# Patient Record
Sex: FEMALE | Race: WHITE | Employment: OTHER | URBAN - METROPOLITAN AREA
[De-identification: names, ages, dates, MRNs, and addresses within clinical notes are randomized per-mention and may not be internally consistent; named-entity substitution may affect disease eponyms.]

---

## 2018-08-22 ENCOUNTER — HOSPITAL ENCOUNTER (OUTPATIENT)
Age: 28
Discharge: HOME | End: 2018-08-24
Payer: COMMERCIAL

## 2018-08-22 PROCEDURE — 87624 HPV HI-RISK TYP POOLED RSLT: CPT

## 2018-08-22 PROCEDURE — G0123 SCREEN CERV/VAG THIN LAYER: HCPCS

## 2018-09-06 LAB
CORRESPONDING PAP CASE #: NORMAL
HPV, HIGH RISK: NEGATIVE

## 2019-04-23 ENCOUNTER — HOSPITAL ENCOUNTER (EMERGENCY)
Age: 29
Discharge: HOME | End: 2019-04-23
Attending: EMERGENCY MEDICINE
Payer: COMMERCIAL

## 2019-04-23 VITALS
RESPIRATION RATE: 16 BRPM | WEIGHT: 135 LBS | SYSTOLIC BLOOD PRESSURE: 136 MMHG | OXYGEN SATURATION: 99 % | TEMPERATURE: 98 F | HEART RATE: 64 BPM | BODY MASS INDEX: 22.49 KG/M2 | DIASTOLIC BLOOD PRESSURE: 82 MMHG | HEIGHT: 65 IN

## 2019-04-23 DIAGNOSIS — R53.83 FATIGUE, UNSPECIFIED TYPE: Primary | ICD-10-CM

## 2019-04-23 LAB
ANION GAP SERPL CALCULATED.3IONS-SCNC: 10 MMOL/L (ref 7–16)
BACTERIA: NORMAL /HPF
BASOPHILS ABSOLUTE: 0.07 E9/L (ref 0–0.2)
BASOPHILS RELATIVE PERCENT: 0.6 % (ref 0–2)
BILIRUBIN URINE: NEGATIVE
BLOOD, URINE: NORMAL
BUN BLDV-MCNC: 23 MG/DL (ref 6–20)
CALCIUM SERPL-MCNC: 9.2 MG/DL (ref 8.6–10.2)
CHLORIDE BLD-SCNC: 101 MMOL/L (ref 98–107)
CLARITY: CLEAR
CO2: 26 MMOL/L (ref 22–29)
COLOR: YELLOW
CREAT SERPL-MCNC: 0.9 MG/DL (ref 0.5–1)
EOSINOPHILS ABSOLUTE: 0.17 E9/L (ref 0.05–0.5)
EOSINOPHILS RELATIVE PERCENT: 1.6 % (ref 0–6)
GFR AFRICAN AMERICAN: >60
GFR NON-AFRICAN AMERICAN: >60 ML/MIN/1.73
GLUCOSE BLD-MCNC: 93 MG/DL (ref 74–99)
GLUCOSE URINE: NEGATIVE MG/DL
HCG, URINE, POC: NEGATIVE
HCT VFR BLD CALC: 43.4 % (ref 34–48)
HEMOGLOBIN: 14.6 G/DL (ref 11.5–15.5)
IMMATURE GRANULOCYTES #: 0.04 E9/L
IMMATURE GRANULOCYTES %: 0.4 % (ref 0–5)
KETONES, URINE: NEGATIVE MG/DL
LEUKOCYTE ESTERASE, URINE: NEGATIVE
LYMPHOCYTES ABSOLUTE: 2.44 E9/L (ref 1.5–4)
LYMPHOCYTES RELATIVE PERCENT: 22.5 % (ref 20–42)
Lab: NORMAL
MCH RBC QN AUTO: 29.7 PG (ref 26–35)
MCHC RBC AUTO-ENTMCNC: 33.6 % (ref 32–34.5)
MCV RBC AUTO: 88.2 FL (ref 80–99.9)
MONO TEST: NEGATIVE
MONOCYTES ABSOLUTE: 0.62 E9/L (ref 0.1–0.95)
MONOCYTES RELATIVE PERCENT: 5.7 % (ref 2–12)
NEGATIVE QC PASS/FAIL: NORMAL
NEUTROPHILS ABSOLUTE: 7.52 E9/L (ref 1.8–7.3)
NEUTROPHILS RELATIVE PERCENT: 69.2 % (ref 43–80)
NITRITE, URINE: NEGATIVE
PDW BLD-RTO: 11.9 FL (ref 11.5–15)
PH UA: 6 (ref 5–9)
PLATELET # BLD: 267 E9/L (ref 130–450)
PMV BLD AUTO: 9.6 FL (ref 7–12)
POSITIVE QC PASS/FAIL: NORMAL
POTASSIUM REFLEX MAGNESIUM: 3.9 MMOL/L (ref 3.5–5)
PROTEIN UA: NEGATIVE MG/DL
RBC # BLD: 4.92 E12/L (ref 3.5–5.5)
RBC UA: NORMAL /HPF (ref 0–2)
SODIUM BLD-SCNC: 137 MMOL/L (ref 132–146)
SPECIFIC GRAVITY UA: 1.01 (ref 1–1.03)
T4 TOTAL: 6.6 MCG/DL (ref 4.5–11.7)
TSH SERPL DL<=0.05 MIU/L-ACNC: 2.67 UIU/ML (ref 0.27–4.2)
UROBILINOGEN, URINE: 0.2 E.U./DL
WBC # BLD: 10.9 E9/L (ref 4.5–11.5)
WBC UA: NORMAL /HPF (ref 0–5)

## 2019-04-23 PROCEDURE — 99283 EMERGENCY DEPT VISIT LOW MDM: CPT

## 2019-04-23 PROCEDURE — 84436 ASSAY OF TOTAL THYROXINE: CPT

## 2019-04-23 PROCEDURE — 81001 URINALYSIS AUTO W/SCOPE: CPT

## 2019-04-23 PROCEDURE — 2580000003 HC RX 258: Performed by: STUDENT IN AN ORGANIZED HEALTH CARE EDUCATION/TRAINING PROGRAM

## 2019-04-23 PROCEDURE — 86308 HETEROPHILE ANTIBODY SCREEN: CPT

## 2019-04-23 PROCEDURE — 84443 ASSAY THYROID STIM HORMONE: CPT

## 2019-04-23 PROCEDURE — 80048 BASIC METABOLIC PNL TOTAL CA: CPT

## 2019-04-23 PROCEDURE — 93005 ELECTROCARDIOGRAM TRACING: CPT | Performed by: NURSE PRACTITIONER

## 2019-04-23 PROCEDURE — 85025 COMPLETE CBC W/AUTO DIFF WBC: CPT

## 2019-04-23 RX ORDER — 0.9 % SODIUM CHLORIDE 0.9 %
1000 INTRAVENOUS SOLUTION INTRAVENOUS ONCE
Status: COMPLETED | OUTPATIENT
Start: 2019-04-23 | End: 2019-04-23

## 2019-04-23 RX ADMIN — SODIUM CHLORIDE 1000 ML: 9 INJECTION, SOLUTION INTRAVENOUS at 21:15

## 2019-04-23 ASSESSMENT — ENCOUNTER SYMPTOMS
SORE THROAT: 0
NAUSEA: 0
EYE PAIN: 0
BACK PAIN: 0
EYE DISCHARGE: 0
DIARRHEA: 0
ABDOMINAL DISTENTION: 0
COUGH: 0
WHEEZING: 0
EYE REDNESS: 0
SHORTNESS OF BREATH: 0
ABDOMINAL PAIN: 0
VOMITING: 0
SINUS PRESSURE: 0

## 2019-04-23 NOTE — ED NOTES
FIRST PROVIDER CONTACT ASSESSMENT NOTE        Department of Emergency Medicine   4/23/19  7:55 PM    Chief Complaint: Fatigue (patient states she has been extremely fatigued for past 2 weeks; has had 2 episodes of lightheadedness; one episode ocurred while at gym today, denies symptoms at this time)      History of Present Illness:    Andreea Livingston is a 33 y.o. female who presents to the ED by private car for progressively worsening extreme fatigue and feeling like going to pass out x1 month, but worsening over the last 2 weeks. She is very active , works out at gym. 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 Menses regular. On no BCM. Focused Screening Exam:  Constitutional:  Alert, appears stated age and is in no distress. *ALLERGIES*     Orange juice; Other; Tetracyclines & related; and Acetaminophen     Vitals:    04/23/19 1953   BP: 129/82   Pulse: 64   Resp: 18   Temp: 98 °F (36.7 °C)   SpO2: 99%   Weight: 135 lb (61.2 kg)   Height: 5' 4.75\" (1.645 m)         Initial Plan of Care:  Initiate Treatment-Testing, Proceed toTreatment Area When Bed Available for ED Attending/MLP to Continue Care    -----------------END OF FIRST PROVIDER CONTACT ASSESSMENT NOTE--------------  Electronically signed by MAYURI Koo CNP   DD: 4/23/19    .      MAYURI Koo CNP  04/23/19 1958

## 2019-04-24 LAB
EKG ATRIAL RATE: 69 BPM
EKG P AXIS: 41 DEGREES
EKG P-R INTERVAL: 144 MS
EKG Q-T INTERVAL: 416 MS
EKG QRS DURATION: 90 MS
EKG QTC CALCULATION (BAZETT): 445 MS
EKG R AXIS: 72 DEGREES
EKG T AXIS: 44 DEGREES
EKG VENTRICULAR RATE: 69 BPM

## 2019-04-24 NOTE — ED PROVIDER NOTES
during the hospital encounter of 04/23/19   CBC Auto Differential   Result Value Ref Range    WBC 10.9 4.5 - 11.5 E9/L    RBC 4.92 3.50 - 5.50 E12/L    Hemoglobin 14.6 11.5 - 15.5 g/dL    Hematocrit 43.4 34.0 - 48.0 %    MCV 88.2 80.0 - 99.9 fL    MCH 29.7 26.0 - 35.0 pg    MCHC 33.6 32.0 - 34.5 %    RDW 11.9 11.5 - 15.0 fL    Platelets 098 494 - 377 E9/L    MPV 9.6 7.0 - 12.0 fL    Neutrophils % 69.2 43.0 - 80.0 %    Immature Granulocytes % 0.4 0.0 - 5.0 %    Lymphocytes % 22.5 20.0 - 42.0 %    Monocytes % 5.7 2.0 - 12.0 %    Eosinophils % 1.6 0.0 - 6.0 %    Basophils % 0.6 0.0 - 2.0 %    Neutrophils # 7.52 (H) 1.80 - 7.30 E9/L    Immature Granulocytes # 0.04 E9/L    Lymphocytes # 2.44 1.50 - 4.00 E9/L    Monocytes # 0.62 0.10 - 0.95 E9/L    Eosinophils # 0.17 0.05 - 0.50 E9/L    Basophils # 0.07 0.00 - 0.20 N9/K   Basic Metabolic Panel w/ Reflex to MG   Result Value Ref Range    Sodium 137 132 - 146 mmol/L    Potassium reflex Magnesium 3.9 3.5 - 5.0 mmol/L    Chloride 101 98 - 107 mmol/L    CO2 26 22 - 29 mmol/L    Anion Gap 10 7 - 16 mmol/L    Glucose 93 74 - 99 mg/dL    BUN 23 (H) 6 - 20 mg/dL    CREATININE 0.9 0.5 - 1.0 mg/dL    GFR Non-African American >60 >=60 mL/min/1.73    GFR African American >60     Calcium 9.2 8.6 - 10.2 mg/dL   Urinalysis, reflex to microscopic   Result Value Ref Range    Color, UA Yellow Straw/Yellow    Clarity, UA Clear Clear    Glucose, Ur Negative Negative mg/dL    Bilirubin Urine Negative Negative    Ketones, Urine Negative Negative mg/dL    Specific Gravity, UA 1.010 1.005 - 1.030    Blood, Urine TRACE-INTACT Negative    pH, UA 6.0 5.0 - 9.0    Protein, UA Negative Negative mg/dL    Urobilinogen, Urine 0.2 <2.0 E.U./dL    Nitrite, Urine Negative Negative    Leukocyte Esterase, Urine Negative Negative   Mononucleosis screen   Result Value Ref Range    Mono Test Negative Negative   TSH without Reflex   Result Value Ref Range    TSH 2.670 0.270 - 4.200 uIU/mL   T4   Result Value Ref [facility]  Resident  04/24/19 [id number]

## 2019-10-02 ENCOUNTER — HOSPITAL ENCOUNTER (OUTPATIENT)
Age: 29
Discharge: HOME | End: 2019-10-04
Payer: COMMERCIAL

## 2019-10-02 LAB
BILIRUBIN URINE: NEGATIVE
BLOOD, URINE: NEGATIVE
CLARITY: CLEAR
COLOR: YELLOW
GLUCOSE URINE: NEGATIVE MG/DL
HCT VFR BLD CALC: 42.5 % (ref 34–48)
HEMOGLOBIN: 13.7 G/DL (ref 11.5–15.5)
KETONES, URINE: NEGATIVE MG/DL
LEUKOCYTE ESTERASE, URINE: NEGATIVE
MCH RBC QN AUTO: 29.7 PG (ref 26–35)
MCHC RBC AUTO-ENTMCNC: 32.2 % (ref 32–34.5)
MCV RBC AUTO: 92.2 FL (ref 80–99.9)
NITRITE, URINE: NEGATIVE
PDW BLD-RTO: 12.5 FL (ref 11.5–15)
PH UA: 6 (ref 5–9)
PLATELET # BLD: 222 E9/L (ref 130–450)
PMV BLD AUTO: 10.3 FL (ref 7–12)
PROTEIN UA: NEGATIVE MG/DL
RBC # BLD: 4.61 E12/L (ref 3.5–5.5)
SPECIFIC GRAVITY UA: 1.02 (ref 1–1.03)
UROBILINOGEN, URINE: 0.2 E.U./DL
WBC # BLD: 9 E9/L (ref 4.5–11.5)

## 2019-10-02 PROCEDURE — 86850 RBC ANTIBODY SCREEN: CPT

## 2019-10-02 PROCEDURE — 86762 RUBELLA ANTIBODY: CPT

## 2019-10-02 PROCEDURE — G0123 SCREEN CERV/VAG THIN LAYER: HCPCS

## 2019-10-02 PROCEDURE — 87624 HPV HI-RISK TYP POOLED RSLT: CPT

## 2019-10-02 PROCEDURE — 86703 HIV-1/HIV-2 1 RESULT ANTBDY: CPT

## 2019-10-02 PROCEDURE — 87491 CHLMYD TRACH DNA AMP PROBE: CPT

## 2019-10-02 PROCEDURE — 85027 COMPLETE CBC AUTOMATED: CPT

## 2019-10-02 PROCEDURE — 87088 URINE BACTERIA CULTURE: CPT

## 2019-10-02 PROCEDURE — 86592 SYPHILIS TEST NON-TREP QUAL: CPT

## 2019-10-02 PROCEDURE — 80307 DRUG TEST PRSMV CHEM ANLYZR: CPT

## 2019-10-02 PROCEDURE — 87340 HEPATITIS B SURFACE AG IA: CPT

## 2019-10-02 PROCEDURE — 81003 URINALYSIS AUTO W/O SCOPE: CPT

## 2019-10-02 PROCEDURE — 87591 N.GONORRHOEAE DNA AMP PROB: CPT

## 2019-10-03 LAB
AMPHETAMINE SCREEN, URINE: NOT DETECTED
ANTIBODY SCREEN: NORMAL
BARBITURATE SCREEN URINE: NOT DETECTED
BENZODIAZEPINE SCREEN, URINE: NOT DETECTED
CANNABINOID SCREEN URINE: NOT DETECTED
COCAINE METABOLITE SCREEN URINE: NOT DETECTED
HEPATITIS B SURFACE ANTIGEN INTERPRETATION: NORMAL
HIV-1 AND HIV-2 ANTIBODIES: NORMAL
Lab: NORMAL
METHADONE SCREEN, URINE: NOT DETECTED
OPIATE SCREEN URINE: NOT DETECTED
PHENCYCLIDINE SCREEN URINE: NOT DETECTED
PROPOXYPHENE SCREEN: NOT DETECTED
RUBELLA ANTIBODY IGG: NORMAL

## 2019-10-04 LAB
RPR: NORMAL
URINE CULTURE, ROUTINE: NORMAL

## 2019-10-07 LAB — ABO/RH: NORMAL

## 2019-10-08 LAB
CHLAMYDIA BY THIN PREP: NEGATIVE
HPV SAMPLE: NORMAL
HPV TYPE 16: NOT DETECTED
HPV TYPE 18: NOT DETECTED
HPV, HIGH RISK OTHER: NOT DETECTED
INTERPRETATION: NORMAL
N. GONORRHOEAE DNA, THIN PREP: NEGATIVE
SOURCE: NORMAL
SOURCE: NORMAL

## 2019-11-27 ENCOUNTER — HOSPITAL ENCOUNTER (OUTPATIENT)
Age: 29
Discharge: HOME | End: 2019-11-29
Payer: COMMERCIAL

## 2019-11-27 PROCEDURE — 82105 ALPHA-FETOPROTEIN SERUM: CPT

## 2019-12-01 LAB
AFP INTERPRETATION: NORMAL
AFP MOM: 2.42
AFP SPECIMEN: NORMAL
DATING: NORMAL
ESTIMATED DUE DATE: NORMAL
FETUS COUNT: NORMAL
GESTATIONAL AGE CALC AT COLLECT: NORMAL
HISTORY/NEURAL TUBE DEFECTS: NO
INSULIN DEP. DIABETIC: NO
MATERNAL AGE AT EDD: 30.5 YR
MATERNAL WEIGHT: NORMAL
PT AFP: 58 NG/ML
RACE: NORMAL
SMOKING: NORMAL

## 2019-12-12 ENCOUNTER — ROUTINE PRENATAL (OUTPATIENT)
Dept: OBGYN CLINIC | Age: 29
End: 2019-12-12
Payer: COMMERCIAL

## 2019-12-12 VITALS
BODY MASS INDEX: 26.29 KG/M2 | WEIGHT: 154 LBS | SYSTOLIC BLOOD PRESSURE: 136 MMHG | DIASTOLIC BLOOD PRESSURE: 81 MMHG | HEIGHT: 64 IN | HEART RATE: 69 BPM

## 2019-12-12 DIAGNOSIS — Z03.75 SUSPECTED SHORTENING OF CERVIX NOT FOUND: ICD-10-CM

## 2019-12-12 DIAGNOSIS — Z34.90 PREGNANCY, UNSPECIFIED GESTATIONAL AGE: ICD-10-CM

## 2019-12-12 DIAGNOSIS — Z36.3 ENCOUNTER FOR ANTENATAL SCREENING FOR MALFORMATION USING ULTRASOUND: ICD-10-CM

## 2019-12-12 LAB
GLUCOSE URINE, POC: NORMAL
PROTEIN UA: NEGATIVE

## 2019-12-12 PROCEDURE — 76811 OB US DETAILED SNGL FETUS: CPT | Performed by: OBSTETRICS & GYNECOLOGY

## 2019-12-12 PROCEDURE — 99201 HC NEW PT, E/M LEVEL 1: CPT | Performed by: OBSTETRICS & GYNECOLOGY

## 2019-12-12 PROCEDURE — 76817 TRANSVAGINAL US OBSTETRIC: CPT | Performed by: OBSTETRICS & GYNECOLOGY

## 2019-12-12 PROCEDURE — 81002 URINALYSIS NONAUTO W/O SCOPE: CPT | Performed by: OBSTETRICS & GYNECOLOGY

## 2019-12-12 RX ORDER — METHYLPREDNISOLONE 4 MG/1
TABLET ORAL
Status: ON HOLD | COMMUNITY
Start: 2019-12-07 | End: 2020-04-18 | Stop reason: ALTCHOICE

## 2019-12-12 RX ORDER — FOLIC ACID 1 MG/1
TABLET ORAL
COMMUNITY

## 2020-01-24 ENCOUNTER — HOSPITAL ENCOUNTER (OUTPATIENT)
Age: 30
Discharge: HOME | End: 2020-01-26
Payer: COMMERCIAL

## 2020-01-24 LAB
GLUCOSE TOLERANCE SCREEN 50G: 103 MG/DL (ref 70–140)
HCT VFR BLD CALC: 42.3 % (ref 34–48)
HEMOGLOBIN: 13.6 G/DL (ref 11.5–15.5)
MCH RBC QN AUTO: 29.8 PG (ref 26–35)
MCHC RBC AUTO-ENTMCNC: 32.2 % (ref 32–34.5)
MCV RBC AUTO: 92.6 FL (ref 80–99.9)
PDW BLD-RTO: 12.7 FL (ref 11.5–15)
PLATELET # BLD: 182 E9/L (ref 130–450)
PMV BLD AUTO: 10.4 FL (ref 7–12)
RBC # BLD: 4.57 E12/L (ref 3.5–5.5)
WBC # BLD: 10.5 E9/L (ref 4.5–11.5)

## 2020-01-24 PROCEDURE — 85027 COMPLETE CBC AUTOMATED: CPT

## 2020-01-24 PROCEDURE — 82950 GLUCOSE TEST: CPT

## 2020-04-08 ENCOUNTER — HOSPITAL ENCOUNTER (OUTPATIENT)
Age: 30
Discharge: HOME | End: 2020-04-10
Payer: COMMERCIAL

## 2020-04-08 PROCEDURE — 87081 CULTURE SCREEN ONLY: CPT

## 2020-04-11 LAB — GROUP B STREP CULTURE: NORMAL

## 2020-04-18 ENCOUNTER — HOSPITAL ENCOUNTER (INPATIENT)
Age: 30
LOS: 1 days | Discharge: HOME | End: 2020-04-20
Attending: OBSTETRICS & GYNECOLOGY | Admitting: OBSTETRICS & GYNECOLOGY
Payer: COMMERCIAL

## 2020-04-19 ENCOUNTER — ANESTHESIA (OUTPATIENT)
Dept: LABOR AND DELIVERY | Age: 30
End: 2020-04-19
Payer: COMMERCIAL

## 2020-04-19 ENCOUNTER — ANESTHESIA EVENT (OUTPATIENT)
Dept: LABOR AND DELIVERY | Age: 30
End: 2020-04-19
Payer: COMMERCIAL

## 2020-04-19 PROBLEM — O42.90 PROM (PREMATURE RUPTURE OF MEMBRANES): Status: ACTIVE | Noted: 2020-04-19

## 2020-04-19 LAB
ABO/RH: NORMAL
AMPHETAMINE SCREEN, URINE: NOT DETECTED
ANTIBODY SCREEN: NORMAL
BARBITURATE SCREEN URINE: NOT DETECTED
BASOPHILS ABSOLUTE: 0.08 E9/L (ref 0–0.2)
BASOPHILS RELATIVE PERCENT: 0.5 % (ref 0–2)
BENZODIAZEPINE SCREEN, URINE: NOT DETECTED
CANNABINOID SCREEN URINE: NOT DETECTED
COCAINE METABOLITE SCREEN URINE: NOT DETECTED
EOSINOPHILS ABSOLUTE: 0.15 E9/L (ref 0.05–0.5)
EOSINOPHILS RELATIVE PERCENT: 1 % (ref 0–6)
FENTANYL SCREEN, URINE: NOT DETECTED
HCT VFR BLD CALC: 41.4 % (ref 34–48)
HEMOGLOBIN: 14.2 G/DL (ref 11.5–15.5)
IMMATURE GRANULOCYTES #: 0.16 E9/L
IMMATURE GRANULOCYTES %: 1.1 % (ref 0–5)
LYMPHOCYTES ABSOLUTE: 2.29 E9/L (ref 1.5–4)
LYMPHOCYTES RELATIVE PERCENT: 15.2 % (ref 20–42)
Lab: NORMAL
MCH RBC QN AUTO: 30.4 PG (ref 26–35)
MCHC RBC AUTO-ENTMCNC: 34.3 % (ref 32–34.5)
MCV RBC AUTO: 88.7 FL (ref 80–99.9)
METHADONE SCREEN, URINE: NOT DETECTED
MONOCYTES ABSOLUTE: 1.23 E9/L (ref 0.1–0.95)
MONOCYTES RELATIVE PERCENT: 8.2 % (ref 2–12)
NEUTROPHILS ABSOLUTE: 11.14 E9/L (ref 1.8–7.3)
NEUTROPHILS RELATIVE PERCENT: 74 % (ref 43–80)
OPIATE SCREEN URINE: NOT DETECTED
OXYCODONE URINE: NOT DETECTED
PDW BLD-RTO: 13.4 FL (ref 11.5–15)
PHENCYCLIDINE SCREEN URINE: NOT DETECTED
PLATELET # BLD: 265 E9/L (ref 130–450)
PMV BLD AUTO: 10.5 FL (ref 7–12)
RBC # BLD: 4.67 E12/L (ref 3.5–5.5)
WBC # BLD: 15.1 E9/L (ref 4.5–11.5)

## 2020-04-19 PROCEDURE — 2500000003 HC RX 250 WO HCPCS

## 2020-04-19 PROCEDURE — 6370000000 HC RX 637 (ALT 250 FOR IP): Performed by: OBSTETRICS & GYNECOLOGY

## 2020-04-19 PROCEDURE — 2500000003 HC RX 250 WO HCPCS: Performed by: ANESTHESIOLOGY

## 2020-04-19 PROCEDURE — 86901 BLOOD TYPING SEROLOGIC RH(D): CPT

## 2020-04-19 PROCEDURE — 7200000001 HC VAGINAL DELIVERY

## 2020-04-19 PROCEDURE — 99233 SBSQ HOSP IP/OBS HIGH 50: CPT | Performed by: MIDWIFE

## 2020-04-19 PROCEDURE — 6360000002 HC RX W HCPCS: Performed by: OBSTETRICS & GYNECOLOGY

## 2020-04-19 PROCEDURE — 86850 RBC ANTIBODY SCREEN: CPT

## 2020-04-19 PROCEDURE — 1220000000 HC SEMI PRIVATE OB R&B

## 2020-04-19 PROCEDURE — 6360000002 HC RX W HCPCS

## 2020-04-19 PROCEDURE — 80307 DRUG TEST PRSMV CHEM ANLYZR: CPT

## 2020-04-19 PROCEDURE — 6360000002 HC RX W HCPCS: Performed by: MIDWIFE

## 2020-04-19 PROCEDURE — 2580000003 HC RX 258: Performed by: MIDWIFE

## 2020-04-19 PROCEDURE — 3700000025 EPIDURAL BLOCK: Performed by: ANESTHESIOLOGY

## 2020-04-19 PROCEDURE — 85025 COMPLETE CBC W/AUTO DIFF WBC: CPT

## 2020-04-19 PROCEDURE — 2580000003 HC RX 258: Performed by: OBSTETRICS & GYNECOLOGY

## 2020-04-19 PROCEDURE — 51701 INSERT BLADDER CATHETER: CPT

## 2020-04-19 PROCEDURE — 36415 COLL VENOUS BLD VENIPUNCTURE: CPT

## 2020-04-19 PROCEDURE — 86900 BLOOD TYPING SEROLOGIC ABO: CPT

## 2020-04-19 RX ORDER — SODIUM CHLORIDE, SODIUM LACTATE, POTASSIUM CHLORIDE, CALCIUM CHLORIDE 600; 310; 30; 20 MG/100ML; MG/100ML; MG/100ML; MG/100ML
INJECTION, SOLUTION INTRAVENOUS CONTINUOUS
Status: DISCONTINUED | OUTPATIENT
Start: 2020-04-19 | End: 2020-04-20 | Stop reason: HOSPADM

## 2020-04-19 RX ORDER — SODIUM CHLORIDE 0.9 % (FLUSH) 0.9 %
10 SYRINGE (ML) INJECTION PRN
Status: DISCONTINUED | OUTPATIENT
Start: 2020-04-19 | End: 2020-04-20 | Stop reason: HOSPADM

## 2020-04-19 RX ORDER — ACETAMINOPHEN 650 MG
TABLET, EXTENDED RELEASE ORAL
Status: DISCONTINUED
Start: 2020-04-19 | End: 2020-04-19

## 2020-04-19 RX ORDER — IBUPROFEN 800 MG/1
800 TABLET ORAL EVERY 8 HOURS PRN
Status: DISCONTINUED | OUTPATIENT
Start: 2020-04-19 | End: 2020-04-20 | Stop reason: HOSPADM

## 2020-04-19 RX ORDER — ONDANSETRON 2 MG/ML
4 INJECTION INTRAMUSCULAR; INTRAVENOUS EVERY 6 HOURS PRN
Status: DISCONTINUED | OUTPATIENT
Start: 2020-04-19 | End: 2020-04-19

## 2020-04-19 RX ORDER — NALBUPHINE HCL 10 MG/ML
5 AMPUL (ML) INJECTION EVERY 4 HOURS PRN
Status: DISCONTINUED | OUTPATIENT
Start: 2020-04-19 | End: 2020-04-19

## 2020-04-19 RX ORDER — SODIUM CHLORIDE, SODIUM LACTATE, POTASSIUM CHLORIDE, CALCIUM CHLORIDE 600; 310; 30; 20 MG/100ML; MG/100ML; MG/100ML; MG/100ML
INJECTION, SOLUTION INTRAVENOUS CONTINUOUS
Status: DISCONTINUED | OUTPATIENT
Start: 2020-04-19 | End: 2020-04-19

## 2020-04-19 RX ORDER — FERROUS SULFATE 325(65) MG
325 TABLET ORAL 2 TIMES DAILY WITH MEALS
Status: DISCONTINUED | OUTPATIENT
Start: 2020-04-19 | End: 2020-04-20 | Stop reason: HOSPADM

## 2020-04-19 RX ORDER — DOCUSATE SODIUM 100 MG/1
100 CAPSULE, LIQUID FILLED ORAL 2 TIMES DAILY
Status: DISCONTINUED | OUTPATIENT
Start: 2020-04-19 | End: 2020-04-20 | Stop reason: HOSPADM

## 2020-04-19 RX ORDER — LANOLIN 100 %
OINTMENT (GRAM) TOPICAL PRN
Status: DISCONTINUED | OUTPATIENT
Start: 2020-04-19 | End: 2020-04-20 | Stop reason: HOSPADM

## 2020-04-19 RX ORDER — LIDOCAINE HYDROCHLORIDE 10 MG/ML
INJECTION, SOLUTION INFILTRATION; PERINEURAL
Status: DISCONTINUED
Start: 2020-04-19 | End: 2020-04-19

## 2020-04-19 RX ORDER — NALOXONE HYDROCHLORIDE 0.4 MG/ML
0.4 INJECTION, SOLUTION INTRAMUSCULAR; INTRAVENOUS; SUBCUTANEOUS PRN
Status: DISCONTINUED | OUTPATIENT
Start: 2020-04-19 | End: 2020-04-19

## 2020-04-19 RX ORDER — SODIUM CHLORIDE 0.9 % (FLUSH) 0.9 %
10 SYRINGE (ML) INJECTION EVERY 12 HOURS SCHEDULED
Status: DISCONTINUED | OUTPATIENT
Start: 2020-04-19 | End: 2020-04-20 | Stop reason: HOSPADM

## 2020-04-19 RX ADMIN — LIDOCAINE HYDROCHLORIDE: 10 INJECTION, SOLUTION INFILTRATION; PERINEURAL at 09:15

## 2020-04-19 RX ADMIN — DOCUSATE SODIUM 100 MG: 100 CAPSULE, LIQUID FILLED ORAL at 12:31

## 2020-04-19 RX ADMIN — Medication: at 09:18

## 2020-04-19 RX ADMIN — Medication 1 MG: at 02:37

## 2020-04-19 RX ADMIN — SODIUM CHLORIDE, POTASSIUM CHLORIDE, SODIUM LACTATE AND CALCIUM CHLORIDE 125 ML/HR: 600; 310; 30; 20 INJECTION, SOLUTION INTRAVENOUS at 10:34

## 2020-04-19 RX ADMIN — SODIUM CHLORIDE, POTASSIUM CHLORIDE, SODIUM LACTATE AND CALCIUM CHLORIDE: 600; 310; 30; 20 INJECTION, SOLUTION INTRAVENOUS at 00:20

## 2020-04-19 RX ADMIN — SODIUM CHLORIDE, POTASSIUM CHLORIDE, SODIUM LACTATE AND CALCIUM CHLORIDE: 600; 310; 30; 20 INJECTION, SOLUTION INTRAVENOUS at 04:35

## 2020-04-19 RX ADMIN — Medication 15 ML/HR: at 05:07

## 2020-04-19 RX ADMIN — SODIUM CHLORIDE, PRESERVATIVE FREE 10 ML: 5 INJECTION INTRAVENOUS at 12:31

## 2020-04-19 RX ADMIN — Medication 1 MILLI-UNITS/MIN: at 00:35

## 2020-04-19 RX ADMIN — Medication 15 ML: at 05:05

## 2020-04-19 RX ADMIN — BUTORPHANOL TARTRATE 1 MG: 2 INJECTION, SOLUTION INTRAMUSCULAR; INTRAVENOUS at 02:37

## 2020-04-19 RX ADMIN — BENZOCAINE AND LEVOMENTHOL: 200; 5 SPRAY TOPICAL at 12:31

## 2020-04-19 RX ADMIN — IBUPROFEN 800 MG: 800 TABLET, FILM COATED ORAL at 20:03

## 2020-04-19 RX ADMIN — IBUPROFEN 800 MG: 800 TABLET, FILM COATED ORAL at 12:00

## 2020-04-19 RX ADMIN — Medication 999 MILLI-UNITS/MIN: at 09:26

## 2020-04-19 RX ADMIN — DOCUSATE SODIUM 100 MG: 100 CAPSULE, LIQUID FILLED ORAL at 20:04

## 2020-04-19 ASSESSMENT — PAIN DESCRIPTION - DESCRIPTORS
DESCRIPTORS: PRESSURE
DESCRIPTORS: TENDER

## 2020-04-19 ASSESSMENT — PAIN SCALES - GENERAL
PAINLEVEL_OUTOF10: 5
PAINLEVEL_OUTOF10: 6
PAINLEVEL_OUTOF10: 3
PAINLEVEL_OUTOF10: 3
PAINLEVEL_OUTOF10: 10
PAINLEVEL_OUTOF10: 10
PAINLEVEL_OUTOF10: 5

## 2020-04-19 ASSESSMENT — PAIN DESCRIPTION - ONSET
ONSET: ON-GOING
ONSET: GRADUAL

## 2020-04-19 ASSESSMENT — PAIN - FUNCTIONAL ASSESSMENT
PAIN_FUNCTIONAL_ASSESSMENT: ACTIVITIES ARE NOT PREVENTED
PAIN_FUNCTIONAL_ASSESSMENT: ACTIVITIES ARE NOT PREVENTED

## 2020-04-19 ASSESSMENT — PAIN DESCRIPTION - PAIN TYPE
TYPE: ACUTE PAIN
TYPE: ACUTE PAIN

## 2020-04-19 ASSESSMENT — PAIN DESCRIPTION - FREQUENCY
FREQUENCY: INTERMITTENT
FREQUENCY: INTERMITTENT

## 2020-04-19 ASSESSMENT — PAIN DESCRIPTION - PROGRESSION
CLINICAL_PROGRESSION: GRADUALLY WORSENING
CLINICAL_PROGRESSION: NOT CHANGED

## 2020-04-19 ASSESSMENT — PAIN DESCRIPTION - LOCATION
LOCATION: PERINEUM
LOCATION: PERINEUM;RECTUM

## 2020-04-19 ASSESSMENT — PAIN DESCRIPTION - ORIENTATION: ORIENTATION: LOWER

## 2020-04-19 NOTE — PROGRESS NOTES
Pt called out very uncomfortable, SVE 3-4/80/-2. Pt requesting epidural. Bolus started. Will notify anesthesia.

## 2020-04-19 NOTE — H&P
Department of Obstetrics and Gynecology  Nurse Practitioner Obstetrics History and Physical        CHIEF COMPLAINT:  Rupture of membranes    HISTORY OF PRESENT ILLNESS:  Lisset Husbands is a 26 y.o. female , Patient's last menstrual period was 2019.,  at 39w0d. Presents to L&D with c/o sudden gush of  Clear fluid around 9pm and has continued to leak clear fluid. Denies bleeding. No contractions prior to rupture but feeling menstrual type cramping now. States pregnancy uncomplicated, GBS negative      OB History        1    Para        Term                AB        Living           SAB        TAB        Ectopic        Molar        Multiple        Live Births                    Estimated Due Date: Estimated Date of Delivery: 20      Pregnancy complicated by:   Patient Active Problem List   Diagnosis Code    Shoulder pain, right M25.511    Encounter for  screening for malformation using ultrasound Z36.3    Suspected shortening of cervix not found Z03.75           PAST OB HISTORY  OB History        1    Para        Term                AB        Living           SAB        TAB        Ectopic        Molar        Multiple        Live Births                      Past Medical History:          Diagnosis Date    Brain anoxic injury ([facility] [address] 2011    patient fell off horse     H/O: blood clots     Headache(784.0)        Past Surgical History:          Procedure Laterality Date    BRAIN SURGERY  2010    craniotomy with  shunt    BREAST ENHANCEMENT SURGERY      GASTROSTOMY TUBE CHANGE      TRACHEOSTOMY TUBE PLACEMENT      WISDOM TOOTH EXTRACTION         Social History:    TOBACCO:   reports that she has never smoked. She has never used smokeless tobacco.  ETOH:   reports no history of alcohol use. DRUGS:   reports no history of drug use.   Family History:       Problem Relation Age of Onset    Arthritis Maternal Grandmother     High Blood Pressure

## 2020-04-19 NOTE — PROGRESS NOTES
Hearing screening results were discussed with parent. Questions answered. Brochure given to parent. Advised to monitor developmental milestones and contact physician for any concerns.    Walter Laguerre

## 2020-04-19 NOTE — ANESTHESIA PRE PROCEDURE
Department of Anesthesiology  Preprocedure Note       Name:  Annie Raymundo   Age:  26 y.o.  :  1990                                          MRN:  [id number]         Date:  2020      Surgeon: * No surgeons listed *    Procedure: Labor Analgesia    Medications prior to admission:   Prior to Admission medications    Medication Sig Start Date End Date Taking? Authorizing Provider   Prenatal Multivit-Min-Fe-FA (PRENATAL VITAMINS PO) PrePlus 27 mg iron-1 mg tablet   Yes Historical Provider, MD   folic acid (FOLVITE) 1 MG tablet folic acid 1 mg tablet   Yes Historical Provider, MD       Current medications:    Current Facility-Administered Medications   Medication Dose Route Frequency Provider Last Rate Last Dose    lactated ringers infusion   Intravenous Continuous MAYURI Galicia  mL/hr at 20 0435      oxytocin (PITOCIN) 30 units in 500 mL infusion  1 rahul-units/min Intravenous Continuous PRN MAYURI Galicia CNM        oxytocin (PITOCIN) 30 units in 500 mL infusion  1 rahul-units/min Intravenous Continuous MAYURI Galicia CNM 6 mL/hr at 20 0300 6 rahul-units/min at 20 0300    butorphanol (STADOL) injection 1 mg  1 mg Intravenous Q3H PRN Francy Morton MD   1 mg at 20 1048    Or    butorphanol (STADOL) injection 2 mg  2 mg Intravenous Q3H PRN Francy Morton MD           Allergies:     Allergies   Allergen Reactions    Other      \"any birth control\"    Tetracyclines & Related Other (See Comments)     Severe arthritis    Acetaminophen Rash       Problem List:    Patient Active Problem List   Diagnosis Code    Shoulder pain, right M25.511    Encounter for  screening for malformation using ultrasound Z36.3    Suspected shortening of cervix not found Z03.75    PROM (premature rupture of membranes) O42.90       Past Medical History:        Diagnosis Date    Brain anoxic injury ([facility] [address] 2011    patient fell off horse     H/O:

## 2020-04-19 NOTE — PROGRESS NOTES
Patient up to bathroom, voided and sheeba care provided. Patient tolerated well. Patient transferred to MB.

## 2020-04-20 VITALS
HEART RATE: 77 BPM | DIASTOLIC BLOOD PRESSURE: 66 MMHG | TEMPERATURE: 98.8 F | HEIGHT: 64 IN | BODY MASS INDEX: 29.37 KG/M2 | WEIGHT: 172 LBS | RESPIRATION RATE: 18 BRPM | SYSTOLIC BLOOD PRESSURE: 116 MMHG

## 2020-04-20 PROCEDURE — 6360000002 HC RX W HCPCS: Performed by: OBSTETRICS & GYNECOLOGY

## 2020-04-20 PROCEDURE — 90471 IMMUNIZATION ADMIN: CPT | Performed by: OBSTETRICS & GYNECOLOGY

## 2020-04-20 PROCEDURE — 90715 TDAP VACCINE 7 YRS/> IM: CPT | Performed by: OBSTETRICS & GYNECOLOGY

## 2020-04-20 PROCEDURE — 6370000000 HC RX 637 (ALT 250 FOR IP): Performed by: OBSTETRICS & GYNECOLOGY

## 2020-04-20 RX ORDER — IBUPROFEN 800 MG/1
800 TABLET ORAL EVERY 8 HOURS PRN
Qty: 18 TABLET | Refills: 0 | Status: SHIPPED | OUTPATIENT
Start: 2020-04-20 | End: 2020-10-13

## 2020-04-20 RX ADMIN — IBUPROFEN 800 MG: 800 TABLET, FILM COATED ORAL at 14:40

## 2020-04-20 RX ADMIN — DOCUSATE SODIUM 100 MG: 100 CAPSULE, LIQUID FILLED ORAL at 09:23

## 2020-04-20 RX ADMIN — TETANUS TOXOID, REDUCED DIPHTHERIA TOXOID AND ACELLULAR PERTUSSIS VACCINE, ADSORBED 0.5 ML: 5; 2.5; 8; 8; 2.5 SUSPENSION INTRAMUSCULAR at 09:26

## 2020-04-20 RX ADMIN — IBUPROFEN 800 MG: 800 TABLET, FILM COATED ORAL at 05:34

## 2020-04-20 RX ADMIN — BENZOCAINE AND LEVOMENTHOL: 200; 5 SPRAY TOPICAL at 09:23

## 2020-04-20 ASSESSMENT — PAIN DESCRIPTION - PROGRESSION
CLINICAL_PROGRESSION: GRADUALLY WORSENING
CLINICAL_PROGRESSION: GRADUALLY WORSENING
CLINICAL_PROGRESSION: NOT CHANGED

## 2020-04-20 ASSESSMENT — PAIN DESCRIPTION - DESCRIPTORS: DESCRIPTORS: SORE

## 2020-04-20 ASSESSMENT — PAIN DESCRIPTION - LOCATION: LOCATION: PERINEUM

## 2020-04-20 ASSESSMENT — PAIN - FUNCTIONAL ASSESSMENT
PAIN_FUNCTIONAL_ASSESSMENT: ACTIVITIES ARE NOT PREVENTED

## 2020-04-20 ASSESSMENT — PAIN DESCRIPTION - ORIENTATION: ORIENTATION: LOWER

## 2020-04-20 ASSESSMENT — PAIN SCALES - GENERAL
PAINLEVEL_OUTOF10: 3
PAINLEVEL_OUTOF10: 2

## 2020-04-20 ASSESSMENT — PAIN DESCRIPTION - PAIN TYPE: TYPE: ACUTE PAIN

## 2020-04-20 ASSESSMENT — PAIN DESCRIPTION - FREQUENCY: FREQUENCY: INTERMITTENT

## 2020-04-20 NOTE — FLOWSHEET NOTE
Assisted mother with breastfeeding positions and latching attempts. Infant, sleepy, disinterested. Occasionally will cry at breast, but will not latch. Mother to keep attempting to nurse every 2 hours or with infant hunger cues.

## 2020-08-17 ENCOUNTER — HOSPITAL ENCOUNTER (OUTPATIENT)
Age: 30
Discharge: HOME | End: 2020-08-19
Payer: COMMERCIAL

## 2020-08-17 PROCEDURE — 87591 N.GONORRHOEAE DNA AMP PROB: CPT

## 2020-08-17 PROCEDURE — 86703 HIV-1/HIV-2 1 RESULT ANTBDY: CPT

## 2020-08-17 PROCEDURE — 86850 RBC ANTIBODY SCREEN: CPT

## 2020-08-17 PROCEDURE — 87088 URINE BACTERIA CULTURE: CPT

## 2020-08-17 PROCEDURE — 87491 CHLMYD TRACH DNA AMP PROBE: CPT

## 2020-08-17 PROCEDURE — 81003 URINALYSIS AUTO W/O SCOPE: CPT

## 2020-08-17 PROCEDURE — 86900 BLOOD TYPING SEROLOGIC ABO: CPT

## 2020-08-17 PROCEDURE — 86592 SYPHILIS TEST NON-TREP QUAL: CPT

## 2020-08-17 PROCEDURE — 87340 HEPATITIS B SURFACE AG IA: CPT

## 2020-08-17 PROCEDURE — 80307 DRUG TEST PRSMV CHEM ANLYZR: CPT

## 2020-08-17 PROCEDURE — 85027 COMPLETE CBC AUTOMATED: CPT

## 2020-08-17 PROCEDURE — 86901 BLOOD TYPING SEROLOGIC RH(D): CPT

## 2020-08-17 PROCEDURE — 86762 RUBELLA ANTIBODY: CPT

## 2020-08-18 LAB
ABO/RH: NORMAL
AMPHETAMINE SCREEN, URINE: NOT DETECTED
ANTIBODY SCREEN: NORMAL
BARBITURATE SCREEN URINE: NOT DETECTED
BENZODIAZEPINE SCREEN, URINE: NOT DETECTED
BILIRUBIN URINE: NEGATIVE
BLOOD, URINE: NEGATIVE
CANNABINOID SCREEN URINE: NOT DETECTED
CLARITY: CLEAR
COCAINE METABOLITE SCREEN URINE: NOT DETECTED
COLOR: YELLOW
FENTANYL SCREEN, URINE: NOT DETECTED
GLUCOSE URINE: NEGATIVE MG/DL
HCT VFR BLD CALC: 44.7 % (ref 34–48)
HEMOGLOBIN: 14.5 G/DL (ref 11.5–15.5)
HEPATITIS B SURFACE ANTIGEN INTERPRETATION: NORMAL
HIV-1 AND HIV-2 ANTIBODIES: NORMAL
KETONES, URINE: NEGATIVE MG/DL
LEUKOCYTE ESTERASE, URINE: NEGATIVE
Lab: NORMAL
MCH RBC QN AUTO: 29.2 PG (ref 26–35)
MCHC RBC AUTO-ENTMCNC: 32.4 % (ref 32–34.5)
MCV RBC AUTO: 90.1 FL (ref 80–99.9)
METHADONE SCREEN, URINE: NOT DETECTED
NITRITE, URINE: NEGATIVE
OPIATE SCREEN URINE: NOT DETECTED
OXYCODONE URINE: NOT DETECTED
PDW BLD-RTO: 12.8 FL (ref 11.5–15)
PH UA: 6 (ref 5–9)
PHENCYCLIDINE SCREEN URINE: NOT DETECTED
PLATELET # BLD: 265 E9/L (ref 130–450)
PMV BLD AUTO: 10.5 FL (ref 7–12)
PROTEIN UA: NEGATIVE MG/DL
RBC # BLD: 4.96 E12/L (ref 3.5–5.5)
RPR: NORMAL
RUBELLA ANTIBODY IGG: NORMAL
SPECIFIC GRAVITY UA: 1.02 (ref 1–1.03)
UROBILINOGEN, URINE: 0.2 E.U./DL
WBC # BLD: 9.2 E9/L (ref 4.5–11.5)

## 2020-08-20 LAB
C TRACH DNA GENITAL QL NAA+PROBE: NEGATIVE
N. GONORRHOEAE DNA: NEGATIVE
SOURCE: NORMAL
URINE CULTURE, ROUTINE: NORMAL

## 2020-09-18 ENCOUNTER — HOSPITAL ENCOUNTER (OUTPATIENT)
Age: 30
Discharge: HOME | End: 2020-09-20
Payer: COMMERCIAL

## 2020-09-18 PROCEDURE — 82105 ALPHA-FETOPROTEIN SERUM: CPT

## 2020-09-22 LAB
AFP INTERPRETATION: NORMAL
AFP MOM: 0.6
AFP SPECIMEN: NORMAL
DATING: NORMAL
ESTIMATED DUE DATE: NORMAL
FETUS COUNT: NORMAL
GESTATIONAL AGE CALC AT COLLECT: NORMAL
HISTORY/NEURAL TUBE DEFECTS: NO
INSULIN DEP. DIABETIC: NO
MATERNAL AGE AT EDD: 31.3 YR
MATERNAL WEIGHT: NORMAL
PT AFP: 18 NG/ML
RACE: NORMAL
SMOKING: NO

## 2020-10-13 ENCOUNTER — INITIAL PRENATAL (OUTPATIENT)
Dept: OBGYN CLINIC | Age: 30
End: 2020-10-13
Payer: COMMERCIAL

## 2020-10-13 VITALS
BODY MASS INDEX: 26.82 KG/M2 | HEART RATE: 63 BPM | SYSTOLIC BLOOD PRESSURE: 111 MMHG | DIASTOLIC BLOOD PRESSURE: 61 MMHG | WEIGHT: 156.25 LBS | TEMPERATURE: 97.4 F

## 2020-10-13 PROBLEM — Z3A.19 19 WEEKS GESTATION OF PREGNANCY: Status: ACTIVE | Noted: 2020-10-13

## 2020-10-13 LAB
GLUCOSE URINE, POC: NEGATIVE
PROTEIN UA: NEGATIVE

## 2020-10-13 PROCEDURE — 76805 OB US >/= 14 WKS SNGL FETUS: CPT | Performed by: OBSTETRICS & GYNECOLOGY

## 2020-10-13 PROCEDURE — 99203 OFFICE O/P NEW LOW 30 MIN: CPT

## 2020-10-13 NOTE — PROGRESS NOTES
Flutters  Denies bleeding, leakage of fluid or contractions      Call your obstetrician for:    Bleeding, leakage of fluid, abdominal tenderness, headaches, burred vision or  epigastric pain  or increased in urinary frequency.    Call if any questions or concerns

## 2020-10-13 NOTE — PATIENT INSTRUCTIONS
Patient Education        Weeks 18 to 22 of Your Pregnancy: Care Instructions  Your Care Instructions     Your baby is continuing to develop quickly. At this stage, babies can now suck their thumbs,  firmly with their hands, and open and close their eyelids. Sometime between 18 and 22 weeks, you will start to feel your baby move. At first, these small fetal movements feel like fluttering or \"butterflies. \" Some women say that they feel like gas bubbles. As the baby grows, these movements will become stronger. You may also notice that your baby kicks and hiccups. During this time, you may find that your nausea and fatigue are gone. Overall, you may feel better and have more energy than you did in your first trimester. But you may also have new discomforts now, such as sleep problems or leg cramps. This care sheet can help you ease these discomforts. Follow-up care is a key part of your treatment and safety. Be sure to make and go to all appointments, and call your doctor if you are having problems. It's also a good idea to know your test results and keep a list of the medicines you take. How can you care for yourself at home? Ease sleep problems  · Avoid caffeine in drinks or chocolate late in the day. · Get some exercise every day. · Take a warm shower or bath before bed. · Have a light snack or glass of milk at bedtime. · Do relaxation exercises in bed to calm your mind and body. · Support your legs and back with extra pillows. Try a pillow between your legs if you sleep on your side. · Do not use sleeping pills or alcohol. They could harm your baby. Ease leg cramps  · Do not massage your calf during the cramp. · Sit on a firm bed or chair. Straighten your leg, and bend your foot (flex your ankle) slowly upward, toward your knee. Bend your toes up and down. · Stand on a cool, flat surface. Stretch your toes upward, and take small steps walking on your heels.   · Use a heating pad or hot water your doctor if you are having problems. It's also a good idea to know your test results and keep a list of the medicines you take. How can you care for yourself at home? Ease sleep problems  · Avoid caffeine in drinks or chocolate late in the day. · Get some exercise every day. · Take a warm shower or bath before bed. · Have a light snack or glass of milk at bedtime. · Do relaxation exercises in bed to calm your mind and body. · Support your legs and back with extra pillows. Try a pillow between your legs if you sleep on your side. · Do not use sleeping pills or alcohol. They could harm your baby. Ease leg cramps  · Do not massage your calf during the cramp. · Sit on a firm bed or chair. Straighten your leg, and bend your foot (flex your ankle) slowly upward, toward your knee. Bend your toes up and down. · Stand on a cool, flat surface. Stretch your toes upward, and take small steps walking on your heels. · Use a heating pad or hot water bottle to help with muscle ache. Prevent leg cramps  · Be sure to get enough calcium. If you are worried that you are not getting enough, talk to your doctor. · Exercise every day, and stretch your legs before bed. · Take a warm bath before bed, and try leg warmers at night. Where can you learn more? Go to [email or web address].[email or web address]. org and sign in to your [employer] account. Enter P363 in the [facility] box to learn more about \"Weeks 18 to 22 of Your Pregnancy: Care Instructions. \"     If you do not have an account, please click on the \"Sign Up Now\" link. Current as of: February 11, 2020               Content Version: 12.6  © 0568-1113 [employer]. Care instructions adapted under license by [facility] ([facility]). If you have questions about a medical condition or this instruction, always ask your healthcare professional. [address] any warranty or liability for your use of this information.

## 2021-03-01 ENCOUNTER — HOSPITAL ENCOUNTER (INPATIENT)
Age: 31
LOS: 2 days | Discharge: HOME | End: 2021-03-03
Attending: OBSTETRICS & GYNECOLOGY | Admitting: OBSTETRICS & GYNECOLOGY
Payer: COMMERCIAL

## 2021-03-01 ENCOUNTER — APPOINTMENT (OUTPATIENT)
Dept: LABOR AND DELIVERY | Age: 31
End: 2021-03-01
Payer: COMMERCIAL

## 2021-03-01 PROBLEM — Z3A.39 39 WEEKS GESTATION OF PREGNANCY: Status: ACTIVE | Noted: 2021-03-01

## 2021-03-01 LAB
ABO/RH: NORMAL
AMPHETAMINE SCREEN, URINE: NOT DETECTED
ANTIBODY SCREEN: NORMAL
BARBITURATE SCREEN URINE: NOT DETECTED
BENZODIAZEPINE SCREEN, URINE: NOT DETECTED
CANNABINOID SCREEN URINE: NOT DETECTED
COCAINE METABOLITE SCREEN URINE: NOT DETECTED
FENTANYL SCREEN, URINE: NOT DETECTED
HCT VFR BLD CALC: 39.7 % (ref 34–48)
HEMOGLOBIN: 13.4 G/DL (ref 11.5–15.5)
Lab: NORMAL
MCH RBC QN AUTO: 29.3 PG (ref 26–35)
MCHC RBC AUTO-ENTMCNC: 33.8 % (ref 32–34.5)
MCV RBC AUTO: 86.7 FL (ref 80–99.9)
METHADONE SCREEN, URINE: NOT DETECTED
OPIATE SCREEN URINE: NOT DETECTED
OXYCODONE URINE: NOT DETECTED
PDW BLD-RTO: 13.1 FL (ref 11.5–15)
PHENCYCLIDINE SCREEN URINE: NOT DETECTED
PLATELET # BLD: 276 E9/L (ref 130–450)
PMV BLD AUTO: 10.4 FL (ref 7–12)
RBC # BLD: 4.58 E12/L (ref 3.5–5.5)
SARS-COV-2, NAAT: NOT DETECTED
WBC # BLD: 15.5 E9/L (ref 4.5–11.5)

## 2021-03-01 PROCEDURE — 1220000001 HC SEMI PRIVATE L&D R&B

## 2021-03-01 PROCEDURE — 86900 BLOOD TYPING SEROLOGIC ABO: CPT

## 2021-03-01 PROCEDURE — 87635 SARS-COV-2 COVID-19 AMP PRB: CPT

## 2021-03-01 PROCEDURE — 86901 BLOOD TYPING SEROLOGIC RH(D): CPT

## 2021-03-01 PROCEDURE — 85027 COMPLETE CBC AUTOMATED: CPT

## 2021-03-01 PROCEDURE — 36415 COLL VENOUS BLD VENIPUNCTURE: CPT

## 2021-03-01 PROCEDURE — 2580000003 HC RX 258: Performed by: OBSTETRICS & GYNECOLOGY

## 2021-03-01 PROCEDURE — 80307 DRUG TEST PRSMV CHEM ANLYZR: CPT

## 2021-03-01 PROCEDURE — 86850 RBC ANTIBODY SCREEN: CPT

## 2021-03-01 RX ORDER — LIDOCAINE HYDROCHLORIDE 10 MG/ML
INJECTION, SOLUTION INFILTRATION; PERINEURAL
Status: DISCONTINUED
Start: 2021-03-01 | End: 2021-03-02

## 2021-03-01 RX ORDER — ACETAMINOPHEN 650 MG
TABLET, EXTENDED RELEASE ORAL
Status: DISCONTINUED
Start: 2021-03-01 | End: 2021-03-02

## 2021-03-01 RX ORDER — ONDANSETRON 2 MG/ML
4 INJECTION INTRAMUSCULAR; INTRAVENOUS EVERY 6 HOURS PRN
Status: DISCONTINUED | OUTPATIENT
Start: 2021-03-01 | End: 2021-03-02

## 2021-03-01 RX ORDER — SODIUM CHLORIDE, SODIUM LACTATE, POTASSIUM CHLORIDE, CALCIUM CHLORIDE 600; 310; 30; 20 MG/100ML; MG/100ML; MG/100ML; MG/100ML
INJECTION, SOLUTION INTRAVENOUS CONTINUOUS
Status: DISCONTINUED | OUTPATIENT
Start: 2021-03-01 | End: 2021-03-02

## 2021-03-01 RX ORDER — BIOTIN 5 MG
1 CAPSULE ORAL
COMMUNITY

## 2021-03-01 RX ADMIN — SODIUM CHLORIDE, POTASSIUM CHLORIDE, SODIUM LACTATE AND CALCIUM CHLORIDE: 600; 310; 30; 20 INJECTION, SOLUTION INTRAVENOUS at 22:30

## 2021-03-01 ASSESSMENT — PAIN SCALES - GENERAL: PAINLEVEL_OUTOF10: 0

## 2021-03-02 ENCOUNTER — ANESTHESIA (OUTPATIENT)
Dept: LABOR AND DELIVERY | Age: 31
End: 2021-03-02
Payer: COMMERCIAL

## 2021-03-02 ENCOUNTER — ANESTHESIA EVENT (OUTPATIENT)
Dept: LABOR AND DELIVERY | Age: 31
End: 2021-03-02
Payer: COMMERCIAL

## 2021-03-02 PROCEDURE — 7200000001 HC VAGINAL DELIVERY

## 2021-03-02 PROCEDURE — 6360000002 HC RX W HCPCS: Performed by: OBSTETRICS & GYNECOLOGY

## 2021-03-02 PROCEDURE — 51701 INSERT BLADDER CATHETER: CPT

## 2021-03-02 PROCEDURE — 2500000003 HC RX 250 WO HCPCS: Performed by: ANESTHESIOLOGY

## 2021-03-02 PROCEDURE — 0KQM0ZZ REPAIR PERINEUM MUSCLE, OPEN APPROACH: ICD-10-PCS | Performed by: OBSTETRICS & GYNECOLOGY

## 2021-03-02 PROCEDURE — 2580000003 HC RX 258: Performed by: OBSTETRICS & GYNECOLOGY

## 2021-03-02 PROCEDURE — 3E033VJ INTRODUCTION OF OTHER HORMONE INTO PERIPHERAL VEIN, PERCUTANEOUS APPROACH: ICD-10-PCS | Performed by: OBSTETRICS & GYNECOLOGY

## 2021-03-02 PROCEDURE — 1220000000 HC SEMI PRIVATE OB R&B

## 2021-03-02 PROCEDURE — 3700000025 EPIDURAL BLOCK: Performed by: ANESTHESIOLOGY

## 2021-03-02 PROCEDURE — 6370000000 HC RX 637 (ALT 250 FOR IP): Performed by: OBSTETRICS & GYNECOLOGY

## 2021-03-02 RX ORDER — SODIUM CHLORIDE 0.9 % (FLUSH) 0.9 %
10 SYRINGE (ML) INJECTION EVERY 12 HOURS SCHEDULED
Status: DISCONTINUED | OUTPATIENT
Start: 2021-03-02 | End: 2021-03-03 | Stop reason: HOSPADM

## 2021-03-02 RX ORDER — FERROUS SULFATE 325(65) MG
325 TABLET ORAL 2 TIMES DAILY WITH MEALS
Status: DISCONTINUED | OUTPATIENT
Start: 2021-03-02 | End: 2021-03-03 | Stop reason: HOSPADM

## 2021-03-02 RX ORDER — NALOXONE HYDROCHLORIDE 0.4 MG/ML
0.4 INJECTION, SOLUTION INTRAMUSCULAR; INTRAVENOUS; SUBCUTANEOUS PRN
Status: DISCONTINUED | OUTPATIENT
Start: 2021-03-02 | End: 2021-03-02

## 2021-03-02 RX ORDER — IBUPROFEN 800 MG/1
800 TABLET ORAL EVERY 8 HOURS PRN
Status: DISCONTINUED | OUTPATIENT
Start: 2021-03-02 | End: 2021-03-03 | Stop reason: HOSPADM

## 2021-03-02 RX ORDER — ONDANSETRON 2 MG/ML
4 INJECTION INTRAMUSCULAR; INTRAVENOUS EVERY 6 HOURS PRN
Status: DISCONTINUED | OUTPATIENT
Start: 2021-03-02 | End: 2021-03-02

## 2021-03-02 RX ORDER — LIDOCAINE HYDROCHLORIDE 10 MG/ML
20 INJECTION, SOLUTION EPIDURAL; INFILTRATION; INTRACAUDAL; PERINEURAL ONCE
Status: DISCONTINUED | OUTPATIENT
Start: 2021-03-02 | End: 2021-03-03 | Stop reason: HOSPADM

## 2021-03-02 RX ORDER — SODIUM CHLORIDE, SODIUM LACTATE, POTASSIUM CHLORIDE, CALCIUM CHLORIDE 600; 310; 30; 20 MG/100ML; MG/100ML; MG/100ML; MG/100ML
INJECTION, SOLUTION INTRAVENOUS CONTINUOUS
Status: DISCONTINUED | OUTPATIENT
Start: 2021-03-02 | End: 2021-03-03 | Stop reason: HOSPADM

## 2021-03-02 RX ORDER — ACETAMINOPHEN 650 MG
TABLET, EXTENDED RELEASE ORAL PRN
Status: DISCONTINUED | OUTPATIENT
Start: 2021-03-02 | End: 2021-03-03 | Stop reason: HOSPADM

## 2021-03-02 RX ORDER — MODIFIED LANOLIN
OINTMENT (GRAM) TOPICAL PRN
Status: DISCONTINUED | OUTPATIENT
Start: 2021-03-02 | End: 2021-03-03 | Stop reason: HOSPADM

## 2021-03-02 RX ORDER — NALBUPHINE HCL 10 MG/ML
5 AMPUL (ML) INJECTION EVERY 4 HOURS PRN
Status: DISCONTINUED | OUTPATIENT
Start: 2021-03-02 | End: 2021-03-02

## 2021-03-02 RX ORDER — DOCUSATE SODIUM 100 MG/1
100 CAPSULE, LIQUID FILLED ORAL 2 TIMES DAILY
Status: DISCONTINUED | OUTPATIENT
Start: 2021-03-02 | End: 2021-03-03 | Stop reason: HOSPADM

## 2021-03-02 RX ORDER — SODIUM CHLORIDE 0.9 % (FLUSH) 0.9 %
10 SYRINGE (ML) INJECTION PRN
Status: DISCONTINUED | OUTPATIENT
Start: 2021-03-02 | End: 2021-03-03 | Stop reason: HOSPADM

## 2021-03-02 RX ADMIN — IBUPROFEN 800 MG: 800 TABLET, FILM COATED ORAL at 11:28

## 2021-03-02 RX ADMIN — SODIUM CHLORIDE, PRESERVATIVE FREE 10 ML: 5 INJECTION INTRAVENOUS at 20:05

## 2021-03-02 RX ADMIN — Medication 10 ML/HR: at 05:01

## 2021-03-02 RX ADMIN — DOCUSATE SODIUM 100 MG: 100 CAPSULE, LIQUID FILLED ORAL at 11:28

## 2021-03-02 RX ADMIN — Medication 1 MILLI-UNITS/MIN: at 02:00

## 2021-03-02 RX ADMIN — BENZOCAINE AND LEVOMENTHOL: 200; 5 SPRAY TOPICAL at 11:28

## 2021-03-02 RX ADMIN — IBUPROFEN 800 MG: 800 TABLET, FILM COATED ORAL at 20:06

## 2021-03-02 RX ADMIN — Medication 166.7 ML: at 07:35

## 2021-03-02 RX ADMIN — DOCUSATE SODIUM 100 MG: 100 CAPSULE, LIQUID FILLED ORAL at 20:06

## 2021-03-02 ASSESSMENT — PAIN SCALES - GENERAL
PAINLEVEL_OUTOF10: 5
PAINLEVEL_OUTOF10: 4

## 2021-03-02 ASSESSMENT — PAIN DESCRIPTION - FREQUENCY: FREQUENCY: INTERMITTENT

## 2021-03-02 ASSESSMENT — PAIN DESCRIPTION - ORIENTATION: ORIENTATION: LOWER;MID

## 2021-03-02 ASSESSMENT — PAIN DESCRIPTION - PAIN TYPE: TYPE: ACUTE PAIN
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
## 2021-03-02 NOTE — H&P
[id number] [address]                              HISTORY AND PHYSICAL    PATIENT NAME: Young Weir                     :        1990  MED REC NO:   [id number]                            ROOM:       0304  ACCOUNT NO:   [de-identified]                           ADMIT DATE: 2021  PROVIDER:     Lilo Elizabeth MD    HISTORY OF PRESENT ILLNESS:  The patient is a 65-year-old  2,  para 1. Due date is 2021. The patient was given the option of  expected management versus induction. She wanted to be induced. The  patient came in for induction on the evening of 2021. Denied  leaking of fluid or vaginal bleeding. Normal fetal movement. Some  irregular contractions. MEDICATIONS:  Prenatal vitamins. ALLERGIES:  TYLENOL, MINOCYCLINE. PAST MEDICAL HISTORY:  Traumatic brain injury in . PAST SURGICAL HISTORY:  Tracheotomy, breast implants, brain stent,  wisdom teeth. PAST OB HISTORY:  In 2020, 39 weeks, vaginal delivery, no  complications. LABORATORIES:  Blood type is A positive, group B strep is negative. PHYSICAL EXAMINATION:  VITAL SIGNS:  Temperature 98.2, pulse 93, respirations 14, blood  pressure 146/85, repeat 136/86. LUNGS:  Clear to auscultation. HEART:  Regular rate and rhythm. ABDOMEN:  Gravid. Cervical exam in the office 2 cm, 50%, -2, in vertex. ASSESSMENT:  1.  Gravid 2, para 1, 39 weeks. 2.  For induction. PLAN:  Admit and to start Pitocin.         Stefanie Puentes MD    D: 2021 13:22:45       T: 2021 14:27:36     /[email or web address]  Job#: [id number]     Doc#: [id number]    CC:

## 2021-03-02 NOTE — PROGRESS NOTES
Dr. Dimple Jackson updated on SVE of 4/70/-2 and sitting up for epidural, no new orders at this time.

## 2021-03-02 NOTE — PROGRESS NOTES
Patient oriented to room 304. Initial education and hospital policy reviewed. Patient resting comfortably in bed. Assessment as charted. Patient refusing flu and tdap.   Electronically signed by Freddy Tate RN on 3/2/2021 at 11:21 AM

## 2021-03-02 NOTE — PROGRESS NOTES
Department of Anesthesiology  Continuous Labor Epidural Note - Periodic Monitoring Note    Name:  Mohit Ordaz                                         Age: 31 y.o. MRN:  [id number]  Obstetrician:  No name on file.     Last Pain Score: (Charted in Doc Flowsheet)  Pain Level: 0    /67   Pulse 85   Temp 37.1 °C (98.8 °F) (Oral)   Resp 14   Ht 5' 5\" (1.651 m)   Wt 173 lb (78.5 kg)   SpO2 95%   BMI 28.79 kg/m²     A/P: Mohit Ordaz is a 31y.o. year-old at 36w3d with continuous labor epidural and good pain control - Yes;    MAYURI Kim - CRNA3/2/36618:42 AM

## 2021-03-02 NOTE — PLAN OF CARE
Problem: VAGINAL DELIVERY - RECOVERY AND POST PARTUM  Goal: Vital signs are medically acceptable  Outcome: Met This Shift  Goal: Patient will remain free of falls  Outcome: Met This Shift  Goal: Fundus firm at midline  Outcome: Met This Shift  Goal: Moderate rubra without clots, no purulent discharge, no foul smelling lochia  Outcome: Met This Shift  Goal: Positive Mother-Baby interactions are observed  Outcome: Met This Shift     Problem: PAIN  Goal: Patient's pain/discomfort is manageable  Outcome: Met This Shift

## 2021-03-02 NOTE — L&D DELIVERY NOTE
Vaginal Delivery    Infant Wt:  pending    APGARS:     1 minute: 9  5 minute: 9    Time of Delivery: 0731    Fetal Position: occiput anterior  Nuchal cord times two. Delayed cord clamping. Baby Suction with bulb on the perineum. Spontaneous respirations. Placenta delivered spontaneously intact at 0735. Placenta sent to pathology: No     Estimated blood loss:  300 cc    Uterus firm, pitocin running. rectum intact. Episiotomy: No    Laceration: Yes  Second degree laceration. Suture used for repair:  Vicryl 3.0   Cord blood and gases done  Sponge count correct. No complications.

## 2021-03-02 NOTE — ANESTHESIA PROCEDURE NOTES
Epidural Block    Patient location during procedure: OB  Start time: 3/2/2021 4:41 AM  End time: 3/2/2021 5:05 AM  Reason for block: labor epidural  Staffing  Anesthesiologist: Hamilton Krishnan MD  Resident/CRNA: MAYURI Hill CRNA  Preanesthetic Checklist  Completed: patient identified, IV checked, site marked, risks and benefits discussed, surgical consent, monitors and equipment checked, pre-op evaluation, timeout performed, anesthesia consent given, oxygen available and patient being monitored  Epidural  Patient position: sitting  Prep: ChloraPrep  Patient monitoring: cardiac monitor, continuous pulse ox and frequent blood pressure checks  Approach: midline  Location: lumbar (1-5)  Injection technique: FRANCIE air  Provider prep: mask and sterile gloves  Needle  Needle type: Tuohy   Needle gauge: 18 G  Needle length: 2.5 in  Needle insertion depth: 4 cm  Catheter type: end hole  Catheter size: 20 G.   Catheter at skin depth: 11 cm  Test dose: negative  Assessment  Sensory level: T8  Hemodynamics: stable  Attempts: 1

## 2021-03-02 NOTE — PROGRESS NOTES
Dr Dallas Manzanares updated on pt. SVE 8/90/-1. SROM @0559 fluid clear. Pt is comfortable with epidural, is feeling slight pressure with some contractions. FHT reassuring. ED and mild VD with contractions. No new orders received at this time.

## 2021-03-03 VITALS
RESPIRATION RATE: 18 BRPM | TEMPERATURE: 98.8 F | HEART RATE: 80 BPM | HEIGHT: 65 IN | BODY MASS INDEX: 28.82 KG/M2 | DIASTOLIC BLOOD PRESSURE: 58 MMHG | SYSTOLIC BLOOD PRESSURE: 130 MMHG | WEIGHT: 173 LBS | OXYGEN SATURATION: 96 %

## 2021-03-03 PROCEDURE — 6370000000 HC RX 637 (ALT 250 FOR IP): Performed by: OBSTETRICS & GYNECOLOGY

## 2021-03-03 RX ORDER — IBUPROFEN 800 MG/1
800 TABLET ORAL EVERY 8 HOURS PRN
Qty: 21 TABLET | Refills: 0 | Status: SHIPPED | OUTPATIENT
Start: 2021-03-03 | End: 2022-03-11 | Stop reason: ALTCHOICE

## 2021-03-03 RX ADMIN — IBUPROFEN 800 MG: 800 TABLET, FILM COATED ORAL at 04:23

## 2021-03-03 RX ADMIN — DOCUSATE SODIUM 100 MG: 100 CAPSULE, LIQUID FILLED ORAL at 09:55

## 2021-03-03 ASSESSMENT — PAIN SCALES - GENERAL: PAINLEVEL_OUTOF10: 4

## 2021-03-03 NOTE — PROGRESS NOTES
CLINICAL PHARMACY NOTE: MEDS TO [address] Select Patient?: No  Total # of Prescriptions Filled: 1   The following medications were delivered to the patient:  · Ibuprofen 800 mg  Total # of Interventions Completed: 7  Time Spent (min): 60    Additional Documentation:

## 2021-03-03 NOTE — PROGRESS NOTES
Pt discharged home in stable condition with family member as support. Pt taken out in wheelchair with infant in car seat in lap. Pt has discharge instructions in hand.   Electronically signed by Jaime Sage RN on 3/3/2021 at 12:00 PM

## 2021-03-03 NOTE — PROGRESS NOTES
Subjective:    Patient without complaints. Normal lochia. Wants to go home. Objective:  BP (!) 130/58   Pulse 80   Temp 98.8 °F (37.1 °C) (Oral)   Resp 18   Ht 5' 5\" (1.651 m)   Wt 173 lb (78.5 kg)   SpO2 96%   Breastfeeding Unknown   BMI 28.79 kg/m²   Abdomen:  Uterus firm, non-tender  Extremities:  No calf pain    Assessment:  Post-partum day # 1   Vaginal delivery    Plan: Discharge home. Office in 6 wks. Call if pain, fever,heavy bleeding, calf pain, shortness of breath,breast pain or redness or concerns. Nothing in vagina or heavy lifting.

## 2021-03-03 NOTE — PROGRESS NOTES
[facility] [place] [facility] screening results were discussed with parent. Questions answered. Brochure given to parent. Advised to monitor developmental milestones and contact physician for any concerns.    Dalia Florentino

## 2021-03-03 NOTE — PLAN OF CARE
Problem: VAGINAL DELIVERY - RECOVERY AND POST PARTUM  Goal: Vital signs are medically acceptable  3/3/2021 0019 by Emily Vail  Outcome: Met This Shift  3/2/2021 1214 by Valdo Smalls RN  Outcome: Met This Shift  Goal: Patient will remain free of falls  3/3/2021 0019 by Emily Vail  Outcome: Met This Shift  3/2/2021 1214 by Valdo Smalls RN  Outcome: Met This Shift  Goal: Fundus firm at midline  3/3/2021 0019 by Emily Vail  Outcome: Met This Shift  3/2/2021 1214 by Valdo Smalls RN  Outcome: Met This Shift  Goal: Moderate rubra without clots, no purulent discharge, no foul smelling lochia  3/3/2021 0019 by Emily Vail  Outcome: Met This Shift  3/2/2021 1214 by Valdo Smalls RN  Outcome: Met This Shift  Goal: Empties bladder  Outcome: Met This Shift  Goal: Verbalizes understanding of normal bowel function resumption  Outcome: Met This Shift  Goal: Edema will be absent or minimal  Outcome: Met This Shift  Goal: Breasts are soft with nipple integrity intact  Outcome: Met This Shift  Goal: Demonstrates appropriate breast feeding techniques  Outcome: Met This Shift  Goal: Appropriate behavior observed  Outcome: Met This Shift  Goal: Positive Mother-Baby interactions are observed  3/3/2021 0019 by Emily Vail  Outcome: Met This Shift  3/2/2021 1214 by Valdo Smalls RN  Outcome: Met This Shift  Goal: Perineum intact without discharge or hematoma  Outcome: Met This Shift  Goal: Ambulates independently  Outcome: Met This Shift     Problem: PAIN  Goal: Patient's pain/discomfort is manageable  3/3/2021 0019 by Emily Vail  Outcome: Met This Shift  3/2/2021 1214 by Valdo Smalls RN  Outcome: Met This Shift

## 2021-03-03 NOTE — PROGRESS NOTES
Discharge teaching completed for patient. Patient voiced understanding of instructions. Questions answered.   Electronically signed by Freddy Tate RN on 3/3/2021 at 11:46 AM

## 2021-03-03 NOTE — ANESTHESIA POSTPROCEDURE EVALUATION
Department of Anesthesiology  Postprocedure Note    Patient: Army Yates  MRN: [id number]  YOB: 1990  Date of evaluation: 3/3/2021  Time:  3:10 PM     Procedure Summary     Date: 03/02/21 Room / Location:     Anesthesia Start: 0441 Anesthesia Stop: Nelson Grigsby    Procedure: Labor Analgesia Diagnosis:     Scheduled Providers:  Responsible Provider: Onesimo Bloch, MD    Anesthesia Type: general, epidural, spinal ASA Status: 2          Anesthesia Type: general, epidural, spinal    Isabella Phase I:      Isabella Phase II:      Last vitals: Reviewed and per EMR flowsheets.        Anesthesia Post Evaluation    Patient location during evaluation: bedside  Patient participation: complete - patient participated  Level of consciousness: awake  Airway patency: patent  Nausea & Vomiting: no nausea and no vomiting  Complications: no  Cardiovascular status: blood pressure returned to baseline and hemodynamically stable  Respiratory status: acceptable  Hydration status: euvolemic

## 2022-03-11 ENCOUNTER — HOSPITAL ENCOUNTER (EMERGENCY)
Age: 32
Discharge: HOME | End: 2022-03-11
Payer: COMMERCIAL

## 2022-03-11 VITALS
RESPIRATION RATE: 14 BRPM | WEIGHT: 150 LBS | BODY MASS INDEX: 24.99 KG/M2 | TEMPERATURE: 98.6 F | DIASTOLIC BLOOD PRESSURE: 79 MMHG | SYSTOLIC BLOOD PRESSURE: 129 MMHG | OXYGEN SATURATION: 99 % | HEART RATE: 71 BPM | HEIGHT: 65 IN

## 2022-03-11 DIAGNOSIS — M54.50 ACUTE EXACERBATION OF CHRONIC LOW BACK PAIN: Primary | ICD-10-CM

## 2022-03-11 DIAGNOSIS — M53.3 COCCYGEAL PAIN, CHRONIC: ICD-10-CM

## 2022-03-11 DIAGNOSIS — G89.29 ACUTE EXACERBATION OF CHRONIC LOW BACK PAIN: Primary | ICD-10-CM

## 2022-03-11 DIAGNOSIS — G89.29 COCCYGEAL PAIN, CHRONIC: ICD-10-CM

## 2022-03-11 PROCEDURE — 6360000002 HC RX W HCPCS: Performed by: PHYSICIAN ASSISTANT

## 2022-03-11 PROCEDURE — 6370000000 HC RX 637 (ALT 250 FOR IP): Performed by: PHYSICIAN ASSISTANT

## 2022-03-11 PROCEDURE — 96372 THER/PROPH/DIAG INJ SC/IM: CPT

## 2022-03-11 PROCEDURE — 99284 EMERGENCY DEPT VISIT MOD MDM: CPT

## 2022-03-11 RX ORDER — ORPHENADRINE CITRATE 100 MG/1
100 TABLET, EXTENDED RELEASE ORAL 2 TIMES DAILY
Qty: 30 TABLET | Refills: 0 | Status: SHIPPED | OUTPATIENT
Start: 2022-03-11

## 2022-03-11 RX ORDER — MELOXICAM 15 MG/1
15 TABLET ORAL DAILY PRN
Qty: 30 TABLET | Refills: 0 | Status: SHIPPED | OUTPATIENT
Start: 2022-03-11

## 2022-03-11 RX ORDER — KETOROLAC TROMETHAMINE 30 MG/ML
60 INJECTION, SOLUTION INTRAMUSCULAR; INTRAVENOUS ONCE
Status: COMPLETED | OUTPATIENT
Start: 2022-03-11 | End: 2022-03-11

## 2022-03-11 RX ORDER — ORPHENADRINE CITRATE 100 MG/1
100 TABLET, EXTENDED RELEASE ORAL ONCE
Status: COMPLETED | OUTPATIENT
Start: 2022-03-11 | End: 2022-03-11

## 2022-03-11 RX ADMIN — KETOROLAC TROMETHAMINE 60 MG: 30 INJECTION, SOLUTION INTRAMUSCULAR at 10:32

## 2022-03-11 RX ADMIN — ORPHENADRINE CITRATE 100 MG: 100 TABLET, EXTENDED RELEASE ORAL at 10:32

## 2022-03-11 ASSESSMENT — PAIN SCALES - GENERAL: PAINLEVEL_OUTOF10: 10

## 2022-03-11 ASSESSMENT — PAIN DESCRIPTION - PAIN TYPE: TYPE: ACUTE PAIN

## 2022-03-11 NOTE — ED PROVIDER NOTES
[facility]     Department of Emergency Medicine   ED  Provider Note  Admit Date/RoomTime: 3/11/2022  9:46 AM  ED Room: 30/30   Chief Complaint:   Back Pain (lower and tailbone, no injury, states ongoing for years, steroid shots help )    History of Present Illness      Namita Spaulding is a 27 y.o. old female who presents to the ED for low back pain and tailbone pain. Patient states this pain is chronic and has been ongoing for years. She states she usually gets a steroid injection every 6 months which helps her pain. She states she got a steroid injection 2 days ago and she still having pain. She states it is worse when she bends over. She denies any loss of bowel or bladder, saddle paresthesias, or difficulty with urination. She denies any new injury/trauma or fall. She denies radiation of pain into her legs. She has no limb pain or swelling, rash, or weakness. Patient states the pain is worse after standing for long periods of time at work. She is denying any chest pain, shortness of breath, pain with breathing, abdominal pain, nausea, vomiting, diarrhea, or abnormal vaginal bleeding/discharge. Patient is still able to ambulate independently but does have pain while doing so. She is alert and oriented x3 and in no apparent distress at this exam.  Patient is nontoxic-appearing. She states she has a follow-up appointment with her family doctor on March 22. ROS   Pertinent positives and negatives are stated within HPI, all other systems reviewed and are negative. Past Medical History:  has a past medical history of Brain anoxic injury ([facility] [address] H/O: blood clots, Headache(784.0), and Trauma. Past Surgical History:  has a past surgical history that includes [place] tooth extraction; brain surgery (nov 2010); Tracheostomy tube placement; gastrostomy tube change; and Breast enhancement surgery. Social History:  reports that she has never smoked.  She has never used smokeless tobacco. She reports previous alcohol use. She reports that she does not use drugs. Family History: family history includes Arthritis in her maternal grandmother; High Blood Pressure in her maternal grandmother. Allergies: Acetaminophen, Other, and Tetracyclines & related    Physical Exam   VS:  /79   Pulse 71   Temp 98.6 °F (37 °C)   Resp 14   Ht 5' 5\" (1.651 m)   Wt 150 lb (68 kg)   SpO2 99%   BMI 24.96 kg/m²      Oxygen Saturation Interpretation: Normal.    Constitutional:  Alert and oriented x3, development consistent with age, NAD  HEENT:  NC/NT. No bruising or lacerations, Airway patent. Neck:  Normal ROM. Supple. No meningeal signs. Non-tender    Respiratory:  Clear to auscultation and breath sounds equal.  CV:  Regular rate and rhythm  GI:  Abdomen soft, nontender, non-distended, No firm or pulsatile mass. Back: lower lumbar spine and sacral spine midline. Tenderness: Moderate to lower lumbar and coccyx             Swelling: no.              Range of Motion: diminished range with pain. Skin:  no erythema, rash or swelling noted. Distal Function:              Motor deficit: none. Sensory deficit: none. Pulse deficit: none. Extremities: Full ROM x4,  No edema or tenderness   Straight leg raising: negative bilaterally  Integument:  Normal turgor. Warm, dry, without visible rash. Neurological: CN II-XII grossly intact, Motor functions intact   Gait: normal.    Lab / Imaging Results   (All laboratory and radiology results have been personally reviewed by myself)  Labs:  No results found for this visit on 03/11/22. Imaging: All Radiology results interpreted by Radiologist unless otherwise noted.   No orders to display     ED Course / Medical Decision Making     Medications   ketorolac (TORADOL) injection 60 mg (60 mg IntraMUSCular Given 3/11/22 1032)   orphenadrine (NORFLEX) extended release tablet 100 mg (100 mg Oral Given 3/11/22 1032) Re-examination:  Patients symptoms are improving. Consult(s):  None    Procedure(s):  None    Medical Decision Making: At this time the patient is without objective evidence of an acute process requiring inpatient management. CT lumbar spine offered to patient, however, she politely refused. She states she recently had an x-ray done at her chiropractor that just showed \"compression. \" She states she knows she needs an MRI and will just follow-up with her family doctor at already scheduled appointment to get that scheduled. Counseling: The emergency provider has spoken with the patient/caregiver and discussed todays results, in addition to providing specific details for the plan of care and counseling regarding the diagnosis and prognosis. Questions are answered at this time and they are agreeable with the plan. I discussed the differential, results and discharge plan with the patient and/or family/friend/caregiver if present. I emphasized the importance of follow-up with the physician I referred them to in the timeframe recommended. I explained reasons for the patient to return to the Emergency Department. Additional verbal discharge instructions were also given and discussed with the patient to supplement those generated by the EMR. We also discussed medications that were prescribed (if any) including common side effects and interactions. The patient was advised to abstain from driving, operating heavy machinery or making significant decisions while taking medications such as opiates and muscle relaxers that may impair this. All questions were addressed. They understand return precautions and discharge instructions. The patient and/or family/friend/caregiver expressed understanding. Vitals were stable and they were in no distress at discharge. Assessment      1. Acute exacerbation of chronic low back pain    2.  Coccygeal pain, chronic      Plan   Discharge to home  Patient condition is good    New Medications     New Prescriptions    MELOXICAM (MOBIC) 15 MG TABLET    Take 1 tablet by mouth daily as needed for Pain    ORPHENADRINE (NORFLEX) 100 MG EXTENDED RELEASE TABLET    Take 1 tablet by mouth 2 times daily     Electronically signed by Анна Moore PA-C   DD: 3/11/22    **This report was transcribed using voice recognition software. Every effort was made to ensure accuracy; however, inadvertent computerized transcription errors may be present.     END OF ED PROVIDER NOTE        Анна Moore PA-C  03/11/22 1124

## 2023-07-09 ENCOUNTER — HOSPITAL ENCOUNTER (EMERGENCY)
Age: 33
Discharge: HOME | End: 2023-07-09
Payer: COMMERCIAL

## 2023-07-09 VITALS
DIASTOLIC BLOOD PRESSURE: 92 MMHG | HEART RATE: 73 BPM | RESPIRATION RATE: 16 BRPM | SYSTOLIC BLOOD PRESSURE: 130 MMHG | TEMPERATURE: 98.1 F | OXYGEN SATURATION: 99 %

## 2023-07-09 DIAGNOSIS — L29.0 ANAL PRURITUS: Primary | ICD-10-CM

## 2023-07-09 LAB
BILIRUB UR QL STRIP: NEGATIVE
CLARITY UR: CLEAR
CLUE CELLS VAG QL WET PREP: NORMAL
COLOR UR: YELLOW
GLUCOSE UR STRIP-MCNC: NEGATIVE MG/DL
HCG UR QL: NEGATIVE
HGB UR QL STRIP: NEGATIVE
KETONES UR STRIP-MCNC: NEGATIVE MG/DL
LEUKOCYTE ESTERASE UR QL STRIP: NEGATIVE
NITRITE UR QL STRIP: NEGATIVE
PH UR STRIP: 7.5 [PH] (ref 5–9)
PROT UR STRIP-MCNC: NEGATIVE MG/DL
SP GR UR STRIP: 1.01 (ref 1–1.03)
SPECIMEN SOURCE FLD: NORMAL
T VAGINALIS VAG QL WET PREP: NORMAL
UROBILINOGEN UR STRIP-ACNC: 0.2 E.U./DL
YEAST VAG QL WET PREP: NORMAL

## 2023-07-09 PROCEDURE — 81003 URINALYSIS AUTO W/O SCOPE: CPT

## 2023-07-09 PROCEDURE — 99283 EMERGENCY DEPT VISIT LOW MDM: CPT

## 2023-07-09 PROCEDURE — 81025 URINE PREGNANCY TEST: CPT

## 2023-07-09 PROCEDURE — 87210 SMEAR WET MOUNT SALINE/INK: CPT

## 2023-07-09 RX ORDER — ALBENDAZOLE 200 MG/1
400 TABLET, FILM COATED ORAL ONCE
Qty: 2 TABLET | Refills: 0 | Status: SHIPPED | OUTPATIENT
Start: 2023-07-09 | End: 2023-07-09

## 2023-07-09 RX ORDER — FLUCONAZOLE 150 MG/1
150 TABLET ORAL ONCE
Qty: 1 TABLET | Refills: 0 | Status: SHIPPED | OUTPATIENT
Start: 2023-07-12 | End: 2023-07-12

## 2023-07-10 NOTE — ED PROVIDER NOTES
[facility] [address]  Department of Emergency Medicine   ED  Encounter Note  Admit Date/RoomTime: 2023  7:56 PM  ED Room:   NAME: Corinne Zapata  : 1990  MRN: [id number]     Chief Complaint:  Vaginal Itching (States that she had anal itching that started about a month ago; now into vaginal area and very itchy )    HISTORY OF PRESENT ILLNESS        Corinne Zapata is a 34 y.o. female who presents to the ED with a complaint of vaginal and anal itching. Patient states over 3 months she was having anal irritation. It would burn every time she would have a bowel movement, then itch after the bowel movement. She just thought it was hemorrhoids. She was using wet wipes and applying cortisone itch cream.  She states now it is a lot worse. The whole area is raw and irritated. Spreading up to her perineum and vaginal area. She describes it is just significantly itchy. Worse if she gets hot or sweaty. Always worse after a bowel movement. Worse when she tried going into a pool for swimming. She denies abdominal pain. Denies nausea, vomiting or diarrhea. Denies burning with urination. Denies vaginal discharge. She has no STD concerns. She did talk to her PCP about it. He thought perhaps it was eczema so she did try multiple eczema creams, all without relief. Symptoms are moderate in severity. ROS   Pertinent positives and negatives are stated within HPI, all other systems reviewed and are negative. Past Medical History:  has a past medical history of Brain anoxic injury ([address]), H/O: blood clots, Headache(784.0), and Trauma. Surgical History:  has a past surgical history that includes [place] tooth extraction; brain surgery (2010); Tracheostomy tube placement; gastrostomy tube change; and Breast enhancement surgery. Social History:  reports that she has never smoked.  She has never used smokeless tobacco. She reports that she does not

## 2024-02-07 ENCOUNTER — HOSPITAL ENCOUNTER (OUTPATIENT)
Dept: GENERAL RADIOLOGY | Age: 34
Discharge: HOME | End: 2024-02-09
Payer: COMMERCIAL

## 2024-02-07 ENCOUNTER — HOSPITAL ENCOUNTER (OUTPATIENT)
Age: 34
Discharge: HOME | End: 2024-02-09
Payer: COMMERCIAL

## 2024-02-07 DIAGNOSIS — R52 PAIN: ICD-10-CM

## 2024-02-07 PROCEDURE — 73630 X-RAY EXAM OF FOOT: CPT

## 2024-04-22 ENCOUNTER — HOSPITAL ENCOUNTER (EMERGENCY)
Age: 34
Discharge: HOME | End: 2024-04-22
Payer: COMMERCIAL

## 2024-04-22 VITALS
SYSTOLIC BLOOD PRESSURE: 114 MMHG | HEIGHT: 65 IN | RESPIRATION RATE: 18 BRPM | HEART RATE: 75 BPM | TEMPERATURE: 97.7 F | DIASTOLIC BLOOD PRESSURE: 70 MMHG | BODY MASS INDEX: 24.16 KG/M2 | OXYGEN SATURATION: 99 % | WEIGHT: 145 LBS

## 2024-04-22 DIAGNOSIS — S46.811A STRAIN OF RIGHT TRAPEZIUS MUSCLE, INITIAL ENCOUNTER: Primary | ICD-10-CM

## 2024-04-22 DIAGNOSIS — S46.811A STRAIN OF RIGHT SUBSCAPULARIS MUSCLE, INITIAL ENCOUNTER: ICD-10-CM

## 2024-04-22 PROCEDURE — 96372 THER/PROPH/DIAG INJ SC/IM: CPT

## 2024-04-22 PROCEDURE — 99284 EMERGENCY DEPT VISIT MOD MDM: CPT

## 2024-04-22 PROCEDURE — 6360000002 HC RX W HCPCS: Performed by: NURSE PRACTITIONER

## 2024-04-22 RX ORDER — DEXAMETHASONE SODIUM PHOSPHATE 10 MG/ML
10 INJECTION INTRAMUSCULAR; INTRAVENOUS ONCE
Status: COMPLETED | OUTPATIENT
Start: 2024-04-22 | End: 2024-04-22

## 2024-04-22 RX ORDER — ORPHENADRINE CITRATE 100 MG/1
100 TABLET, EXTENDED RELEASE ORAL 2 TIMES DAILY
Qty: 20 TABLET | Refills: 0 | Status: SHIPPED | OUTPATIENT
Start: 2024-04-22 | End: 2024-05-02

## 2024-04-22 RX ORDER — METHYLPREDNISOLONE 4 MG/1
TABLET ORAL
Qty: 21 TABLET | Refills: 0 | Status: SHIPPED | OUTPATIENT
Start: 2024-04-22 | End: 2024-04-28

## 2024-04-22 RX ORDER — M-VIT,TX,IRON,MINS/CALC/FOLIC 27MG-0.4MG
1 TABLET ORAL DAILY
COMMUNITY

## 2024-04-22 RX ORDER — ORPHENADRINE CITRATE 30 MG/ML
60 INJECTION INTRAMUSCULAR; INTRAVENOUS ONCE
Status: COMPLETED | OUTPATIENT
Start: 2024-04-22 | End: 2024-04-22

## 2024-04-22 RX ADMIN — DEXAMETHASONE SODIUM PHOSPHATE 10 MG: 10 INJECTION INTRAMUSCULAR; INTRAVENOUS at 16:00

## 2024-04-22 RX ADMIN — ORPHENADRINE CITRATE 60 MG: 60 INJECTION INTRAMUSCULAR; INTRAVENOUS at 16:00

## 2024-04-22 ASSESSMENT — PAIN DESCRIPTION - DESCRIPTORS: DESCRIPTORS: ACHING;CRAMPING;DISCOMFORT

## 2024-04-22 ASSESSMENT — LIFESTYLE VARIABLES: HOW OFTEN DO YOU HAVE A DRINK CONTAINING ALCOHOL: 2-4 TIMES A MONTH

## 2024-04-22 ASSESSMENT — PAIN DESCRIPTION - ORIENTATION: ORIENTATION: RIGHT

## 2024-04-22 ASSESSMENT — PAIN DESCRIPTION - LOCATION: LOCATION: SHOULDER

## 2024-04-22 ASSESSMENT — PAIN SCALES - GENERAL: PAINLEVEL_OUTOF10: 7

## 2024-04-22 ASSESSMENT — PAIN - FUNCTIONAL ASSESSMENT: PAIN_FUNCTIONAL_ASSESSMENT: 0-10

## 2024-04-22 NOTE — DISCHARGE INSTRUCTIONS
You may  Arnicare gel at any pharmacy, apply directly to the area.  Medrol Dosepak starting tomorrow.  Norflex twice daily for 10 days, you may take a dose tonight before bedtime.  Please perform the attached exercises.

## 2024-04-22 NOTE — ED PROVIDER NOTES
New Prescriptions    METHYLPREDNISOLONE (MEDROL, DAMIR,) 4 MG TABLET    Take by mouth.    ORPHENADRINE (NORFLEX) 100 MG EXTENDED RELEASE TABLET    Take 1 tablet by mouth 2 times daily for 10 days     Electronically signed by MAYURI Rico CNP   DD: 4/22/24  **This report was transcribed using voice recognition software. Every effort was made to ensure accuracy; however, inadvertent computerized transcription errors may be present.  END OF ED PROVIDER NOTE

## 2024-10-07 ENCOUNTER — TRANSCRIBE ORDERS (OUTPATIENT)
Dept: ADMINISTRATIVE | Age: 34
End: 2024-10-07

## 2024-10-07 ENCOUNTER — HOSPITAL ENCOUNTER (OUTPATIENT)
Dept: CT IMAGING | Age: 34
Discharge: HOME | End: 2024-10-09
Payer: COMMERCIAL

## 2024-10-07 DIAGNOSIS — R06.09 OTHER FORMS OF DYSPNEA: ICD-10-CM

## 2024-10-07 DIAGNOSIS — R05.3 CHRONIC COUGH: ICD-10-CM

## 2024-10-07 DIAGNOSIS — R05.3 CHRONIC COUGH: Primary | ICD-10-CM

## 2024-10-07 PROCEDURE — 71250 CT THORAX DX C-: CPT
